# Patient Record
Sex: FEMALE | Race: WHITE | URBAN - METROPOLITAN AREA
[De-identification: names, ages, dates, MRNs, and addresses within clinical notes are randomized per-mention and may not be internally consistent; named-entity substitution may affect disease eponyms.]

---

## 2017-08-26 ENCOUNTER — OFFICE VISIT (OUTPATIENT)
Dept: OPHTHALMOLOGY | Facility: CLINIC | Age: 57
End: 2017-08-26
Payer: COMMERCIAL

## 2017-08-26 DIAGNOSIS — H43.812 POSTERIOR VITREOUS DETACHMENT OF LEFT EYE: Primary | ICD-10-CM

## 2017-08-26 PROCEDURE — 92004 COMPRE OPH EXAM NEW PT 1/>: CPT | Performed by: STUDENT IN AN ORGANIZED HEALTH CARE EDUCATION/TRAINING PROGRAM

## 2017-12-22 ASSESSMENT — SLIT LAMP EXAM - LIDS
COMMENTS: NORMAL
COMMENTS: NORMAL

## 2017-12-22 ASSESSMENT — TONOMETRY
OS_IOP_MMHG: 18
IOP_METHOD: ICARE
OD_IOP_MMHG: 19

## 2017-12-22 ASSESSMENT — EXTERNAL EXAM - RIGHT EYE: OD_EXAM: NORMAL

## 2017-12-22 ASSESSMENT — CONF VISUAL FIELD
OS_NORMAL: 1
OD_NORMAL: 1

## 2017-12-22 ASSESSMENT — VISUAL ACUITY
OD_SC+: -1
OS_SC: 20/20
OD_SC: 20/20
METHOD: SNELLEN - LINEAR

## 2017-12-22 ASSESSMENT — CUP TO DISC RATIO
OS_RATIO: 0.3
OD_RATIO: 0.3

## 2017-12-22 ASSESSMENT — EXTERNAL EXAM - LEFT EYE: OS_EXAM: NORMAL

## 2017-12-23 NOTE — PROGRESS NOTES
Current Eye Medications:  None      Subjective:  Daja Albrecht is a 56 year old F here for a single grey floater in the left eye that started 2 days ago. She denies flashes or curtains/veils over the vision. No previous eye injuries or surgeries.      Objective:  See Ophthalmology Exam.       Assessment:  Daja Albrecht is a 56 year old female who presents with:   Encounter Diagnosis   Name Primary?     Posterior vitreous detachment of left eye Acute        Plan:  Signs and symptoms of retinal detachment (shower of black floaters, frequent flashes of light, curtain over part of visual field) discussed.     Parvez Lemon MD  [phone]

## 2017-12-23 NOTE — PATIENT INSTRUCTIONS
Signs and symptoms of retinal detachment (shower of black floaters, frequent flashes of light, curtain over part of visual field) discussed.     Parvez Lemon MD  [phone]